# Patient Record
Sex: MALE | Race: ASIAN | URBAN - METROPOLITAN AREA
[De-identification: names, ages, dates, MRNs, and addresses within clinical notes are randomized per-mention and may not be internally consistent; named-entity substitution may affect disease eponyms.]

---

## 2024-09-30 ENCOUNTER — OFFICE VISIT (OUTPATIENT)
Dept: FAMILY MEDICINE CLINIC | Facility: CLINIC | Age: 34
End: 2024-09-30
Payer: COMMERCIAL

## 2024-09-30 VITALS
DIASTOLIC BLOOD PRESSURE: 78 MMHG | HEART RATE: 92 BPM | RESPIRATION RATE: 16 BRPM | OXYGEN SATURATION: 98 % | TEMPERATURE: 97 F | BODY MASS INDEX: 28.68 KG/M2 | SYSTOLIC BLOOD PRESSURE: 124 MMHG | WEIGHT: 168 LBS | HEIGHT: 64 IN

## 2024-09-30 DIAGNOSIS — Z00.00 ENCOUNTER FOR ANNUAL PHYSICAL EXAM: Primary | ICD-10-CM

## 2024-09-30 RX ORDER — CYCLOBENZAPRINE HCL 10 MG
10 TABLET ORAL NIGHTLY PRN
Qty: 20 TABLET | Refills: 0 | Status: SHIPPED | OUTPATIENT
Start: 2024-09-30 | End: 2024-10-20

## 2024-09-30 NOTE — PROGRESS NOTES
/78   Pulse 92   Temp 97 °F (36.1 °C) (Temporal)   Resp 16   Ht 5' 4\" (1.626 m)   Wt 168 lb (76.2 kg)   SpO2 98%   BMI 28.84 kg/m²  Body mass index is 28.84 kg/m².     Chief Complaint   Patient presents with    Establish Care    Headache       Monique Ray is a 34 year old male who presents for a complete physical exam.   HPI:   Otherwise healthy male who is here today for the first time has been having on and off headache,   Scale 2 out of 10, now  The pain started after he was working on a clogged sink 2 weeks ago when he made the appointment, his pain was 8 out of 10, has been taking Tylenol with good relief  Denies any tingling numbness in his hands or feet  Denies any bowel or bladder problem  Denies any vision problem  The headache is mostly occipital radiating down to the neck and right shoulder    Is here for his annual physical  Review of system is otherwise negative  He was seen at local urgent care for folliculitis in his beard and was treated with doxycycline    Wt Readings from Last 4 Encounters:   09/30/24 168 lb (76.2 kg)     Body mass index is 28.84 kg/m².   BP Readings from Last 3 Encounters:   09/30/24 124/78      Current Outpatient Medications   Medication Sig Dispense Refill    cyclobenzaprine 10 MG Oral Tab Take 1 tablet (10 mg total) by mouth nightly as needed for Muscle spasms. 20 tablet 0      History reviewed. No pertinent past medical history.   History reviewed. No pertinent surgical history.   Family History   Problem Relation Age of Onset    Hypertension Father     Hypertension Mother       Social History:  Social History     Tobacco Use    Smoking status: Never    Smokeless tobacco: Never   Vaping Use    Vaping status: Never Used   Substance and Sexual Activity    Alcohol use: Never    Drug use: Never      Exercise: none.  Diet: doesn't watch     REVIEW OF SYSTEMS:   GENERAL HEALTH: feels well otherwise, denies fever  SKIN: denies any unusual skin lesions or  rashes  EYES: no visual complaints or deficits  HEENT: denies nasal congestion, sinus pain or sore throat; hearing loss negative  RESPIRATORY: denies shortness of breath, wheezing or cough  CARDIOVASCULAR: denies chest pain or AMADOR; no palpitations  GI: denies nausea, vomiting, constipation, diarrhea; no rectal bleeding; no heartburn  MUSCULOSKELETAL: no joint complaints upper or lower extremities  NEURO: no sensory or motor complaint  PSYCHE: no symptoms of depression or anxiety  HEMATOLOGY: denies h/o anemia; denies bruising or excessive bleeding  ENDOCRINE: denies excessive thirst or urination; denies unexpected wt gain or wt loss  ALLERGY/IMM.: denies food or seasonal allergies    EXAM:   /78   Pulse 92   Temp 97 °F (36.1 °C) (Temporal)   Resp 16   Ht 5' 4\" (1.626 m)   Wt 168 lb (76.2 kg)   SpO2 98%   BMI 28.84 kg/m²  Body mass index is 28.84 kg/m².   GENERAL: well developed, well nourished,in no apparent distress  SKIN: no rashes,no suspicious lesions  HEENT: atraumatic, normocephalic,ears and throat are clear  EYES:PERRLA, EOMI,conjunctiva are clear  NECK: supple,no adenopathy,no bruits  CHEST: no chest tenderness  LUNGS: clear to auscultation  CARDIO: RRR without murmur  GI: good BS's,no masses, HSM or tenderness  : Deferred  RECTAL:Deferred  MUSCULOSKELETAL: back is not tender,FROM of the back  Mild tenderness on the right cervical paraspinal muscles as well as right trapezius  EXTREMITIES: no cyanosis, clubbing or edema  NEURO: Oriented times three,cranial nerves are intact,motor and sensory are grossly intact    ASSESSMENT AND PLAN:   :Monique was seen today for establish care and headache.    Diagnoses and all orders for this visit:    Encounter for annual physical exam  Monique Ray is a 34 year old male who presents for a complete physical exam.   Pt's weight is Body mass index is 28.84 kg/m².,   Eat a heart-healthy diet. Include potassium and fiber, and drink plenty of water.    Patient is advised to lose weight,   Exercise regularly --- Dietary measures discussed include diet about 1800 calories - Excercise regimen also reviewed as well as long term benefits on overall health.   Recommend at least 30 minutes a day 3-4 times a week of aerobic activity such as brisk walking, cycling, aerobics, or swimming.  Anerobic activities also encouraged for overall toning and strength/endurance building  Fasting labs ordered  Immunizations reviewed  Depression screen completed  Diet and exercise counseling given  -     CBC With Differential With Platelet; Future  -     Comp Metabolic Panel (14); Future  -     Lipid Panel; Future  -     TSH and Free T4; Future    Other orders  -     cyclobenzaprine 10 MG Oral Tab; Take 1 tablet (10 mg total) by mouth nightly as needed for Muscle spasms.          The patient indicates understanding of these issues and agrees to the plan.  .      No follow-ups on file.        Xander Stover MD, 9/30/2024, 4:09 PM      This dictation was performed with a verbal recognition program (DRAGON) and it was checked for errors. It is possible that there are still dictated errors within this office note. If so, please bring any errors to my attention for an addendum. All efforts were made to ensure that this office note is accurate